# Patient Record
Sex: FEMALE | Race: ASIAN | Employment: FULL TIME | URBAN - METROPOLITAN AREA
[De-identification: names, ages, dates, MRNs, and addresses within clinical notes are randomized per-mention and may not be internally consistent; named-entity substitution may affect disease eponyms.]

---

## 2021-03-25 ENCOUNTER — HOSPITAL ENCOUNTER (OUTPATIENT)
Dept: PHYSICAL THERAPY | Age: 51
Discharge: HOME | End: 2021-03-25
Payer: COMMERCIAL

## 2021-03-25 PROCEDURE — 97110 THERAPEUTIC EXERCISES: CPT

## 2021-03-25 PROCEDURE — 97161 PT EVAL LOW COMPLEX 20 MIN: CPT

## 2021-03-25 PROCEDURE — 97530 THERAPEUTIC ACTIVITIES: CPT

## 2021-03-25 NOTE — PROGRESS NOTES
[address], [facility], 70 [facility] - Phone: [phone]  Fax: [phone] / [address] [facility]  Patient Name: Beth Burton : 1970   Medical   Diagnosis: Neck pain Treatment Diagnosis: Neck pain   Onset Date: 2+ years     Referral Source: Ruma Orozco DO Start of Care [facility]): 3/25/2021   Prior Hospitalization: See medical history Provider #: [id number]   Prior Level of Function: No pain/tishtness in the left posterior neck with head movements, able to focus at computer, increased ability to perform household/work related activities   Comorbidities: BPPV with R vestibular neuritis 2 years ago, pre diabetes    Medications: Verified on Patient Summary List   The Plan of Care and following information is based on the information from the initial evaluation.   ========================================================================  Assessment / key information: Patient is a RHD 45 y.o. female who presents to [facility] with a diagnosis of neck pain. Patient is her own historian. Occupation: [employer]. Pt presents with c/o chronic left sided posterior neck pain and left shoulder pain of 2 years duration. TAJ unknown, however patient believes it to be secondary to BPPV at that time, which has since resolved after being treated via a vestibular therapist. \"Stress, computer work, and certain activities such as shopping in an unfamiliar store\" aggravate the condition. Upon questioning, patient has followed up with: ENT, opthalmology, and is currently seeing a psychologist for the pain. She denies any c/o: left leg pain/LBP at this time/radicular s/s, decreased hearing, LOB/buckling/paresthesias. She is seeing a chiropractor and purchased a home cervical traction unit which she has tried, but is unable to determine if it helped.  Pt sleeps with a towel under her cervical spine as recommended via chiropractor. An xray series on the C-S was + for reversed curvature. She reports having dry needling in the past without any long term benefit. In addition she reports having no success with craniosacral therapy. \"I did 2 sessions of myofascial-the [place] technique and that seemed to help the best.\" Pt also performs yoga and meditation which helps. She denies any jaw pain, but has a history of bruxism and was instructed to f/u with her Dentist about such. Patient denies drop attacks, diploplia, tinnitus, headaches, dizziness, and phonosensitivity. + photosensitivity with fluorescent lighting. Pt to benefit from a POC to address the above. Current Deficits include: pain, decreased mobility, decreased strength, and decreased postural awareness with resulting limitations in ADL's and in functional abilities. Patient will benefit from a comprehensive POC/HEP to address impairments and restore function in order to return to prior level of function and prevent secondary impairments. Impairments are as follows:   Pain: current pain level 7/10, pain level at worst 8/10, and pain level at best 1/10 TTP L semispinalis capitus, suboccipitals, SCM, and periscap musculature  Posture flattened C-S curvature Observations decreased PA mobility CT junction  AROM/PROM C-S flex WFL ext 35deg RR 60deg LR 45deg P! [id number] SBL 25deg; T-S rotation L 50% R 75%  Strength BUEs grossly 4/5 t/o with 3-/5 lower trap   Special Tests + Bilatral reverse spurlings, + decreased pain with manual traction at 30deg flexion, negative alar lig testing and sharp izabella; negative ULTT  Functional Tests unable to touch chin to clavicle in either direction  Functional Deficits include: see above. Patient's FOTO score was a TBD/100 indicating decreased function.  Patient will benefit from a POC addressing such impairments and limitations in order to improve quality of life and return to PLOF.    ========================================================================  Eval Complexity: History: MEDIUM  Complexity : 1-2 comorbidities / personal factors will impact the outcome/ POC Exam:MEDIUM Complexity : 3 Standardized tests and measures addressing body structure, function, activity limitation and / or participation in recreation  Presentation: MEDIUM Complexity : Evolving with changing characteristics  Clinical Decision Making:Other outcome measures TBA  LOW Overall Complexity:LOW   Problem List: pain affecting function, decrease ROM, decrease strength, decrease ADL/ functional abilitiies, decrease activity tolerance and decrease flexibility/ joint mobility   Treatment Plan may include any combination of the following: Therapeutic exercise, Therapeutic activities, Neuromuscular re-education, Physical agent/modality, Manual therapy, Patient education and Self Care training  Patient / Family readiness to learn indicated by: asking questions  Persons(s) to be included in education: patient (P)  Barriers to Learning/Limitations: None  Measures taken: A HEP was initiated to assist with POC in restoring function   Patient Goal (s): \"work on the computer without pain\"   Patient self reported health status: good  Rehabilitation Potential: excellent  Short Term Goals: To be accomplished in 4 treatments: 1. Goal: Patient will be independent and compliant with HEP in order to progress toward long term goals. Status at last note/certification: issued and reviewed  2. Goal: Patient will demonstrate a +4 on GROC in order to improve QOL  Status at last note/certification: NA    Long Term Goals: To be accomplished in 8-12 treatments: 1. Goal: Patient will improve FOTO assessment score to TBA (FOTO down) pts in order to indicate improved functional abilities. Status at last note/certification: unknown  2. Goal: Patient will demonstrate pain free AROM into Bilateral, cervical rotation to >/= 70deg in order to drive with decreased reliance on mirrors         Status at last note/certification: L 45 P! R 60  3. Goal: Patient will demonstrate increased left thoracic rotation by 25% in order to aid in a return to yoga and decrease stress on the C-S         Status at last note/certification: limited by 25% on the left vs right  4. Goal: Patient will be able to work on the computer for 4 hours without c/o pain in the neck/left shoulder              Status at last note/certification: unable to perform without pain    Frequency / Duration:     Patient to be seen  2  times per week for 8-12  treatments:  Patient / Caregiver education and instruction: exercises    Therapist Signature: Augie Phillips PT Date: 1/67/8572   Certification Period:  Time: 7:54 AM   ========================================================================  I certify that the above Physical Therapy Services are being furnished while the patient is under my care. I agree with the treatment plan and certify that this therapy is necessary. Physician Signature:    __________  Date:     Time:______  Yehuda Castillo,   Please sign and return to [facility], [facility]. or you may fax the signed copy to [phone]. Thank you.

## 2021-03-25 NOTE — PROGRESS NOTES
PHYSICAL THERAPY - DAILY TREATMENT NOTE    Patient Name: Carlyn Saldaña        Date: 3/25/2021  : 1970   yes Patient  Verified  Visit #:   1     Insurance: Payor: Nael Devlin / Plan: [address] [place] [address] / Product Type: PPO /      In time: 800 Out time: 845   Total Treatment Time: 45     Medicare/[facility] Time Tracking (below)   Total Timed Codes (min):  23 1:1 Treatment Time:  45     TREATMENT AREA =  Low back pain [M54.5]  Neck pain [M54.2]    SUBJECTIVE  Pain Level (on 0 to 10 scale):  7  / 10   Medication Changes/New allergies or changes in medical history, any new surgeries or procedures?    no  If yes, update Summary List   Subjective Functional Status/Changes:  []  No changes reported   See Eval for subjective information and c/o. OBJECTIVE  8 min Therapeutic Exercise:  [x]  See flow sheet   Rationale:      increase ROM and increase strength to improve the patients ability to perform activities and decrease pain with movement. 15 min Therapeutic Activity: [x]  See flow sheet  Body part C-S  Avoidance of aggravating factors, education on VOR and importance of continuing with her adaptation therex which was provided via previous therapist, postural re-ed and prolonged stretching/myofascial stretching vs traditional, journaling of triggers and importance of f/u with dentist to address bruxism, pain science   Rationale:    increase ROM, increase strength and postural re-ed to  improve the patients ability to Tolerate basic ADLs and job-related tasks without pain.        Billed With/As:   [] TE   [x] TA   [] Neuro   [] Self Care Patient Education: [x] Review HEP    [] Progressed/Changed HEP based on:   [x] positioning   [x] body mechanics   [x] transfers   [] heat/ice application    [] other:      Other Objective/Functional Measures:    See Eval     Post Treatment Pain Level (on 0 to 10) scale:   0  / 10     ASSESSMENT  Assessment/Changes in Function:     See Eval     []  See Progress Note/Recertification   Patient will continue to benefit from skilled PT services to modify and progress therapeutic interventions to attain remaining goals. Progress toward goals / Updated goals:  Pt denied sharp pain or red flags with initial eval or therapeutic ex. See initial eval. HEP administered.       PLAN  [x]  Upgrade activities as tolerated yes Continue plan of care   []  Discharge due to :    []  Other:      Therapist: Diana Oliveira, PT    Date: 3/25/2021 Time: 12:07 PM     Future Appointments   Date Time Provider Kai Contreras   4/5/2021  3:00 PM [facility] [address] [facility]   4/8/2021  8:30 AM [facility] PT [address] [facility]   4/13/2021  5:00 PM Kristine Postal, PT [facility]   4/15/2021  5:00 PM Kristine Postal, PT [facility]

## 2021-04-05 ENCOUNTER — HOSPITAL ENCOUNTER (OUTPATIENT)
Dept: PHYSICAL THERAPY | Age: 51
Discharge: HOME | End: 2021-04-05
Payer: COMMERCIAL

## 2021-04-05 PROCEDURE — 97110 THERAPEUTIC EXERCISES: CPT | Performed by: GENERAL ACUTE CARE HOSPITAL

## 2021-04-05 PROCEDURE — 97112 NEUROMUSCULAR REEDUCATION: CPT | Performed by: GENERAL ACUTE CARE HOSPITAL

## 2021-04-05 PROCEDURE — 97140 MANUAL THERAPY 1/> REGIONS: CPT | Performed by: GENERAL ACUTE CARE HOSPITAL

## 2021-04-05 NOTE — PROGRESS NOTES
PT DAILY TREATMENT NOTE     Patient Name: Mana Ferrer  Date:2021  : 1970  [x]  Patient  Verified  Payor: LALY Clayton / Plan: [address] [place] [address] / Product Type: PPO /    In time: 303  Out time: 406  Total Treatment Time (min): 63  Total Timed Codes (min): 53  1:1 Treatment Time (min): 53   Visit #: 2 of     Treatment Area: Low back pain [M54.5]  Neck pain [M54.2]    SUBJECTIVE  Pain Level (0-10 scale): 3/10  Any medication changes, allergies to medications, adverse drug reactions, diagnosis change, or new procedure performed?: [x] No    [] Yes (see summary sheet for update)  Subjective functional status/changes:   [] No changes reported  Re told subjective history. Has L hemibody \"tightness\" that she noticed sometime after undergoing treatment for R sided vestibular neuritis.      OBJECTIVE  Modality rationale: decrease pain and increase tissue extensibility to improve the patients ability to perform ADL's   Min Type Additional Details    [] Estim: []Att   []Unatt  []TENS instruct                 []IFC  []Premod []NMES                       []Other:  []w/US   []w/ice   []w/heat  Position:  Location:    []  Traction: [] Cervical       []Lumbar                       [] Prone          []Supine                       []Intermittent   []Continuous Lbs:  [] before manual  [] after manual    []  Ultrasound: []Continuous   [] Pulsed                           []1MHz   []3MHz Location:  W/cm2:    []  Iontophoresis with dexamethasone         Location: [] Take home patch   [] In clinic   10 []  Ice     [x]  heat  []  Ice massage Position: prone  Location: thoracic spine    []  Vasopneumatic Device Pressure: [] lo [] med [] hi   Temp: [] lo [] med [] hi   [x] Skin assessment post-treatment:  [x]intact []redness- no adverse reaction       []redness  adverse reaction:       23 min Therapeutic Exercise:  [] See flow sheet :   Rationale: increase ROM and increase strength to improve the patients ability to sit, stand, walking, ADL's    20 min Neuro Re-Ed:  []  See flow sheet :   Rationale: To facilitation postural re-education     10 min Manual Therapy:  PA mobs to thoracic spine, STM/DTM to L upper trap, rhomboid, thoracic paraspinals, lat    Rationale: decrease pain, increase ROM and increase tissue extensibility to improve sitting tolerance and ADLs  [The manual therapy interventions were performed at a separate and distinct time from the therapeutic activities interventions]            W/ NM min Patient Education: [x] Review HEP    [] Progressed/Changed HEP based on:   Updated HEP - see chart for hard copy       Other Objective/Functional Measures:     First FU treatment - initiated exercises    Pain Level (0-10 scale) post treatment: 0/10    ASSESSMENT/Changes in Function: Good response to thoracic rotation and extension for improved symptoms. Discussed importance of postural re-education while at work includin/90 sitting position, transition from sitting to standing desk position, using a timer for posture correction reminder. Printed out new exercises for HEP. Presents with L sided myofascial tightness. Good response to heat post treatment. Patient will continue to benefit from skilled PT services to modify and progress therapeutic interventions, address functional mobility deficits, address ROM deficits, address strength deficits, analyze and address soft tissue restrictions, analyze and cue movement patterns, analyze and modify body mechanics/ergonomics, assess and modify postural abnormalities, address imbalance/dizziness and instruct in home and community integration to attain remaining goals. [x]  See Plan of Care  []  See progress note/recertification  []  See Discharge Summary         Progress towards goals / Updated goals:  Short Term Goals: To be accomplished in 4 treatments: 1. Goal: Patient will be independent and compliant with HEP in order to progress toward long term goals.  Status at last note/certification: issued and reviewed. Updated today - will monitor compliance (4/5/21)  2. Goal: Patient will demonstrate a +4 on GROC in order to improve QOL  Status at last note/certification: NA     Long Term Goals: To be accomplished in 8-12 treatments: 1. Goal: Patient will improve FOTO assessment score to TBA (FOTO down) pts in order to indicate improved functional abilities. Status at last note/certification: unknown  2. Goal: Patient will demonstrate pain free AROM into Bilateral, cervical rotation to >/= 70deg in order to drive with decreased reliance on mirrors         Status at last note/certification: L 45 P! R 60  3. Ora Sánchez will demonstrate increased left thoracic rotation by 25% in order to aid in a return to yoga and decrease stress on the C-S         Status at last note/certification: limited by 25% on the left vs right  4. Ora Sánchez will be able to work on the computer for 4 hours without c/o pain in the neck/left shoulder              Status at last note/certification: unable to perform without pain       PLAN  [x]  Upgrade activities as tolerated     [x]  Continue plan of care  []  Update interventions per flow sheet       []  Discharge due to:_  []  Other:_      Neetu Parker, PT 4/5/2021  2:28 PM

## 2021-04-08 ENCOUNTER — HOSPITAL ENCOUNTER (OUTPATIENT)
Dept: PHYSICAL THERAPY | Age: 51
Discharge: HOME | End: 2021-04-08
Payer: COMMERCIAL

## 2021-04-08 PROCEDURE — 97112 NEUROMUSCULAR REEDUCATION: CPT | Performed by: GENERAL ACUTE CARE HOSPITAL

## 2021-04-08 PROCEDURE — 97110 THERAPEUTIC EXERCISES: CPT | Performed by: GENERAL ACUTE CARE HOSPITAL

## 2021-04-08 PROCEDURE — 97140 MANUAL THERAPY 1/> REGIONS: CPT | Performed by: GENERAL ACUTE CARE HOSPITAL

## 2021-04-08 NOTE — PROGRESS NOTES
PT DAILY TREATMENT NOTE     Patient Name: Gena Asif  Date:2021  : 1970  [x]  Patient  Verified  Payor: BLUE CROSS / Plan: [address] [place] [address] / Product Type: PPO /    In time: 830  Out time: 925  Total Treatment Time (min): 55  Total Timed Codes (min): 45  1:1 Treatment Time (min): 45  Visit #: 3 of     Treatment Area: Low back pain [M54.5]  Neck pain [M54.2]    SUBJECTIVE  Pain Level (0-10 scale): 7/10   Any medication changes, allergies to medications, adverse drug reactions, diagnosis change, or new procedure performed?: [x] No    [] Yes (see summary sheet for update)  Subjective functional status/changes:   [] No changes reported  \"I slept wrong last night. I tried sleeping on my right side, which I never do and the R side of neck is really irritated this morning\"   Typically has pain on L side of body. Really enjoying the stretches given with HEP last session - no issues  Used a heating pad on neck this morning and reports some relief with this.      OBJECTIVE  Modality rationale: decrease pain and increase tissue extensibility to improve the patients ability to perform ADL's   Min Type Additional Details    [] Estim: []Att   []Unatt  []TENS instruct                 []IFC  []Premod []NMES                       []Other:  []w/US   []w/ice   []w/heat  Position:  Location:    []  Traction: [] Cervical       []Lumbar                       [] Prone          []Supine                       []Intermittent   []Continuous Lbs:  [] before manual  [] after manual    []  Ultrasound: []Continuous   [] Pulsed                           []1MHz   []3MHz Location:  W/cm2:    []  Iontophoresis with dexamethasone         Location: [] Take home patch   [] In clinic   10 []  Ice     [x]  heat  []  Ice massage Position: supine   Location: c/s     []  Vasopneumatic Device Pressure: [] lo [] med [] hi   Temp: [] lo [] med [] hi   [x] Skin assessment post-treatment:  [x]intact []redness- no adverse reaction       []redness  adverse reaction:       23 min Therapeutic Exercise:  [] See flow sheet :   Rationale: increase ROM and increase strength to improve the patients ability to sit, stand, walking, ADL's    12 min Neuro Re-Ed:  []  See flow sheet :   Rationale: To facilitation postural re-education     10 min Manual Therapy: In supine: STM/DTM to R upper trap, levator scap, cervical paraspinals, scalenes, SOR release   Rationale: decrease pain, increase ROM and increase tissue extensibility to improve sitting tolerance and ADLs  [The manual therapy interventions were performed at a separate and distinct time from the therapeutic activities interventions]             min Patient Education: [x] Review HEP    [] Progressed/Changed HEP based on:     Educated on importance of posture during prolonged sitting tasks to avoid excessive stress placed on neck. Other Objective/Functional Measures:       Pain Level (0-10 scale) post treatment: 5/10     ASSESSMENT/Changes in Function: Did not significantly progress exercise program today secondary to flare up of R cervical spine. Reports good tolerance to all stretches and exercises with HEP. Advised to avoid any exercises that are causing neck irritation until her pain subsides. Recommended continued use of heat as needed for pain relief and performing gentle stretches to tolerance. Plan to progress as tolerated NV. Patient will continue to benefit from skilled PT services to modify and progress therapeutic interventions, address functional mobility deficits, address ROM deficits, address strength deficits, analyze and address soft tissue restrictions, analyze and cue movement patterns, analyze and modify body mechanics/ergonomics, assess and modify postural abnormalities, address imbalance/dizziness and instruct in home and community integration to attain remaining goals.      [x]  See Plan of Care  []  See progress note/recertification  []  See Discharge Summary         Progress towards goals / Updated goals:  Short Term Goals: To be accomplished in 4 treatments: 1. Goal: Patient will be independent and compliant with HEP in order to progress toward long term goals. Status at last note/certification: issued and reviewed. Updated today - will monitor compliance (4/5/21)  2. Goal: Patient will demonstrate a +4 on GROC in order to improve QOL  Status at last note/certification: NA     Long Term Goals: To be accomplished in 8-12 treatments: 1. Goal: Patient will improve FOTO assessment score to TBA (FOTO down) pts in order to indicate improved functional abilities. Status at last note/certification: unknown  2. Goal: Patient will demonstrate pain free AROM into Bilateral, cervical rotation to >/= 70deg in order to drive with decreased reliance on mirrors         Status at last note/certification: L 45 P! R 60  3. Edison Alva will demonstrate increased left thoracic rotation by 25% in order to aid in a return to yoga and decrease stress on the C-S         Status at last note/certification: limited by 25% on the left vs right  4. Edison Alva will be able to work on the computer for 4 hours without c/o pain in the neck/left shoulder              Status at last note/certification: unable to perform without pain       PLAN  [x]  Upgrade activities as tolerated     [x]  Continue plan of care  []  Update interventions per flow sheet       []  Discharge due to:_  []  Other:_      Ibis Rose, PT 4/8/2021  2:28 PM

## 2021-04-13 ENCOUNTER — APPOINTMENT (OUTPATIENT)
Dept: PHYSICAL THERAPY | Age: 51
End: 2021-04-13
Payer: COMMERCIAL

## 2021-04-15 ENCOUNTER — APPOINTMENT (OUTPATIENT)
Dept: PHYSICAL THERAPY | Age: 51
End: 2021-04-15
Payer: COMMERCIAL

## 2021-04-20 ENCOUNTER — APPOINTMENT (OUTPATIENT)
Dept: PHYSICAL THERAPY | Age: 51
End: 2021-04-20
Payer: COMMERCIAL

## 2021-04-22 ENCOUNTER — APPOINTMENT (OUTPATIENT)
Dept: PHYSICAL THERAPY | Age: 51
End: 2021-04-22
Payer: COMMERCIAL

## 2021-04-27 ENCOUNTER — APPOINTMENT (OUTPATIENT)
Dept: PHYSICAL THERAPY | Age: 51
End: 2021-04-27
Payer: COMMERCIAL

## 2021-04-29 ENCOUNTER — APPOINTMENT (OUTPATIENT)
Dept: PHYSICAL THERAPY | Age: 51
End: 2021-04-29
Payer: COMMERCIAL

## 2021-05-11 NOTE — PROGRESS NOTES
[address] [facility] AT [facility]  [address] [facility] [address] [place] [address],[address], [place], [place]  Phone [phone]  Fax  SUMMARY  Patient Name: Argelia De : 1970   Treatment/Medical Diagnosis: Low back pain [M54.5]  Neck pain [M54.2]   Referral Source: Nikki Quezada DO     Date of Initial Visit: 2021 Attended Visits: 3 Missed Visits: 1       Summary of Care: Thank you for referring this pleasant patient. Simone Puentes has completed 3 of 8-12 proposed sessions   Patient did not return to physical therapy after 3rd visit on 2021 - reason - \"wanting to try something different\"    Patient did not contact office for any additional need or follow up visits -   Will discharge patient at this time and if patient contact office after today will advise patient that is any additional follow up or recommendation is needed to return to referring physician. Reason for Discharge:  [] The patient was non-compliant with attendance. [x] The patient could not be contacted to schedule further therapy sessions. [] The patient has been discharged based on the orders of the referring physician.  [] The patient has requested to be discharged due to:   [] Patient has met all goals or max benefit has been achieved      If you have any questions/comments please contact us directly at [phone]. Thank you for allowing us to assist in the care of your patient.     Therapist Signature: Yolanda Choi PTA Date: 2021   Therapist   Susan [id number], LEILA Time: 1:35 PM